# Patient Record
Sex: FEMALE | Race: ASIAN
[De-identification: names, ages, dates, MRNs, and addresses within clinical notes are randomized per-mention and may not be internally consistent; named-entity substitution may affect disease eponyms.]

---

## 2023-03-17 ENCOUNTER — APPOINTMENT (OUTPATIENT)
Dept: OBGYN | Facility: CLINIC | Age: 28
End: 2023-03-17
Payer: MEDICAID

## 2023-03-17 ENCOUNTER — ASOB RESULT (OUTPATIENT)
Age: 28
End: 2023-03-17

## 2023-03-17 ENCOUNTER — LABORATORY RESULT (OUTPATIENT)
Age: 28
End: 2023-03-17

## 2023-03-17 VITALS
SYSTOLIC BLOOD PRESSURE: 132 MMHG | HEART RATE: 88 BPM | RESPIRATION RATE: 16 BRPM | HEIGHT: 61 IN | BODY MASS INDEX: 29.07 KG/M2 | OXYGEN SATURATION: 99 % | WEIGHT: 154 LBS | TEMPERATURE: 98.7 F | DIASTOLIC BLOOD PRESSURE: 88 MMHG

## 2023-03-17 DIAGNOSIS — Z01.419 ENCOUNTER FOR GYNECOLOGICAL EXAMINATION (GENERAL) (ROUTINE) W/OUT ABNORMAL FINDINGS: ICD-10-CM

## 2023-03-17 DIAGNOSIS — Z00.00 ENCOUNTER FOR GENERAL ADULT MEDICAL EXAMINATION W/OUT ABNORMAL FINDINGS: ICD-10-CM

## 2023-03-17 DIAGNOSIS — B37.31 ACUTE CANDIDIASIS OF VULVA AND VAGINA: ICD-10-CM

## 2023-03-17 PROCEDURE — 76830 TRANSVAGINAL US NON-OB: CPT

## 2023-03-17 PROCEDURE — 99385 PREV VISIT NEW AGE 18-39: CPT

## 2023-03-17 PROCEDURE — ZZZZZ: CPT

## 2023-03-17 NOTE — HISTORY OF PRESENT ILLNESS
[FreeTextEntry1] : HANSEL ALFORD 28 YO, presents for  Annual\par G 0  \par LMP: 02/23/2023 (Unsure) - Regular Menses. \par Menses was irregular last year. \par Has gained about 30 lbs in 2 years.\par Sexually active, with same partner\par Medication: Minocycline 100 mg \par For facial reasons.\par No family history of breast cancer.\par To do monthly SBE.\par C/O Vaginal discharge on & off with odor. \par For Sonogram today. \par To f/u in 2 weeks for results & possible OCP's.\par NKDA

## 2023-03-17 NOTE — PHYSICAL EXAM
[Soft] : soft [Non-tender] : non-tender [Examination Of The Breasts] : a normal appearance [No Masses] : no breast masses were palpable [Labia Majora] : normal [Labia Minora] : normal [Discharge] : a  ~M vaginal discharge was present [Moderate] : moderate [White] : white [Cheesy] : cheesy [Normal] : normal [Uterine Adnexae] : normal

## 2023-03-20 LAB
C TRACH RRNA SPEC QL NAA+PROBE: NOT DETECTED
CANDIDA VAG CYTO: NOT DETECTED
ESTRADIOL SERPL-MCNC: 192 PG/ML
FSH SERPL-MCNC: 1.6 IU/L
G VAGINALIS+PREV SP MTYP VAG QL MICRO: NOT DETECTED
HCG SERPL-MCNC: <1 MIU/ML
LH SERPL-ACNC: 4.8 IU/L
N GONORRHOEA RRNA SPEC QL NAA+PROBE: NOT DETECTED
PROLACTIN SERPL-MCNC: 25 NG/ML
SOURCE TP AMPLIFICATION: NORMAL
T VAGINALIS VAG QL WET PREP: NOT DETECTED
TESTOST SERPL-MCNC: 36.8 NG/DL
TSH SERPL-ACNC: 1.25 UIU/ML

## 2023-03-23 LAB — CYTOLOGY CVX/VAG DOC THIN PREP: ABNORMAL

## 2023-03-31 ENCOUNTER — APPOINTMENT (OUTPATIENT)
Dept: OBGYN | Facility: CLINIC | Age: 28
End: 2023-03-31
Payer: MEDICAID

## 2023-03-31 VITALS
TEMPERATURE: 98.7 F | SYSTOLIC BLOOD PRESSURE: 132 MMHG | OXYGEN SATURATION: 98 % | BODY MASS INDEX: 29.07 KG/M2 | DIASTOLIC BLOOD PRESSURE: 88 MMHG | RESPIRATION RATE: 16 BRPM | HEART RATE: 95 BPM | HEIGHT: 61 IN | WEIGHT: 154 LBS

## 2023-03-31 DIAGNOSIS — Z30.019 ENCOUNTER FOR INITIAL PRESCRIPTION OF CONTRACEPTIVES, UNSPECIFIED: ICD-10-CM

## 2023-03-31 DIAGNOSIS — E66.9 OBESITY, UNSPECIFIED: ICD-10-CM

## 2023-03-31 PROCEDURE — 99214 OFFICE O/P EST MOD 30 MIN: CPT

## 2023-03-31 RX ORDER — NORETHINDRONE ACETATE AND ETHINYL ESTRADIOL AND FERROUS FUMARATE 1MG-20(21)
1-20 KIT ORAL
Qty: 1 | Refills: 12 | Status: ACTIVE | COMMUNITY
Start: 2023-03-31 | End: 1900-01-01

## 2023-03-31 NOTE — HISTORY OF PRESENT ILLNESS
[FreeTextEntry1] : HANSEL ALFORD 26 YO, presents for Results & OCP's\par G 0  \par LMP: 03/23/2023 (Unsure) - Regular Menses\par Menses was irregular last year\par Medication: Minocycline 100 mg\par Sexually active with same partner.\par PAP- WNL, NG & CT- Negative. \par Vaginosis panel - Negative. \par Blood reaults- Reviewed.\par HBA1C- Elevated, Prolactin- 25\par Copies given to see PCP. \par Pelvic Sonogram -Reviewed. \par Right ovary with a small simple cyst.\par OCP Counseling done- increases DVT/PE risk. \par Non smoker. \par NKDA\par